# Patient Record
Sex: FEMALE | Race: ASIAN | NOT HISPANIC OR LATINO | Employment: UNEMPLOYED | URBAN - METROPOLITAN AREA
[De-identification: names, ages, dates, MRNs, and addresses within clinical notes are randomized per-mention and may not be internally consistent; named-entity substitution may affect disease eponyms.]

---

## 2023-04-27 ENCOUNTER — LAB (OUTPATIENT)
Dept: LAB | Facility: CLINIC | Age: 23
End: 2023-04-27
Payer: MEDICAID

## 2023-04-27 DIAGNOSIS — Z02.89 REFUGEE HEALTH EXAMINATION: Primary | ICD-10-CM

## 2023-04-27 DIAGNOSIS — Z02.89 REFUGEE HEALTH EXAMINATION: ICD-10-CM

## 2023-04-27 DIAGNOSIS — Z02.89 ENCOUNTER FOR HEALTH EXAMINATION OF REFUGEE: ICD-10-CM

## 2023-04-27 LAB
BASOPHILS # BLD AUTO: 0 10E3/UL (ref 0–0.2)
BASOPHILS NFR BLD AUTO: 1 %
EOSINOPHIL # BLD AUTO: 0.5 10E3/UL (ref 0–0.7)
EOSINOPHIL NFR BLD AUTO: 8 %
ERYTHROCYTE [DISTWIDTH] IN BLOOD BY AUTOMATED COUNT: 13.3 % (ref 10–15)
HCT VFR BLD AUTO: 37.9 % (ref 35–47)
HGB BLD-MCNC: 11.9 G/DL (ref 11.7–15.7)
IMM GRANULOCYTES # BLD: 0 10E3/UL
IMM GRANULOCYTES NFR BLD: 0 %
LYMPHOCYTES # BLD AUTO: 1.8 10E3/UL (ref 0.8–5.3)
LYMPHOCYTES NFR BLD AUTO: 29 %
MCH RBC QN AUTO: 25.1 PG (ref 26.5–33)
MCHC RBC AUTO-ENTMCNC: 31.4 G/DL (ref 31.5–36.5)
MCV RBC AUTO: 80 FL (ref 78–100)
MONOCYTES # BLD AUTO: 0.3 10E3/UL (ref 0–1.3)
MONOCYTES NFR BLD AUTO: 5 %
NEUTROPHILS # BLD AUTO: 3.6 10E3/UL (ref 1.6–8.3)
NEUTROPHILS NFR BLD AUTO: 57 %
NRBC # BLD AUTO: 0 10E3/UL
NRBC BLD AUTO-RTO: 0 /100
PLATELET # BLD AUTO: 295 10E3/UL (ref 150–450)
RBC # BLD AUTO: 4.74 10E6/UL (ref 3.8–5.2)
WBC # BLD AUTO: 6.3 10E3/UL (ref 4–11)

## 2023-04-27 PROCEDURE — 86803 HEPATITIS C AB TEST: CPT

## 2023-04-27 PROCEDURE — 86481 TB AG RESPONSE T-CELL SUSP: CPT

## 2023-04-27 PROCEDURE — 36415 COLL VENOUS BLD VENIPUNCTURE: CPT | Performed by: FAMILY MEDICINE

## 2023-04-27 PROCEDURE — 99000 SPECIMEN HANDLING OFFICE-LAB: CPT | Performed by: FAMILY MEDICINE

## 2023-04-27 PROCEDURE — 86765 RUBEOLA ANTIBODY: CPT

## 2023-04-27 PROCEDURE — 87591 N.GONORRHOEAE DNA AMP PROB: CPT

## 2023-04-27 PROCEDURE — 82306 VITAMIN D 25 HYDROXY: CPT

## 2023-04-27 PROCEDURE — 80061 LIPID PANEL: CPT | Performed by: FAMILY MEDICINE

## 2023-04-27 PROCEDURE — 86762 RUBELLA ANTIBODY: CPT

## 2023-04-27 PROCEDURE — 86704 HEP B CORE ANTIBODY TOTAL: CPT

## 2023-04-27 PROCEDURE — 86787 VARICELLA-ZOSTER ANTIBODY: CPT

## 2023-04-27 PROCEDURE — 86682 HELMINTH ANTIBODY: CPT | Mod: 90 | Performed by: FAMILY MEDICINE

## 2023-04-27 PROCEDURE — 86780 TREPONEMA PALLIDUM: CPT

## 2023-04-27 PROCEDURE — 86706 HEP B SURFACE ANTIBODY: CPT

## 2023-04-27 PROCEDURE — 87389 HIV-1 AG W/HIV-1&-2 AB AG IA: CPT

## 2023-04-27 PROCEDURE — 87340 HEPATITIS B SURFACE AG IA: CPT

## 2023-04-27 PROCEDURE — 87491 CHLMYD TRACH DNA AMP PROBE: CPT

## 2023-04-27 PROCEDURE — 85025 COMPLETE CBC W/AUTO DIFF WBC: CPT | Performed by: FAMILY MEDICINE

## 2023-04-27 PROCEDURE — 80053 COMPREHEN METABOLIC PANEL: CPT | Performed by: FAMILY MEDICINE

## 2023-04-27 PROCEDURE — 86708 HEPATITIS A ANTIBODY: CPT

## 2023-04-28 LAB
ALBUMIN SERPL BCG-MCNC: 4.6 G/DL (ref 3.5–5.2)
ALP SERPL-CCNC: 65 U/L (ref 35–104)
ALT SERPL W P-5'-P-CCNC: 13 U/L (ref 10–35)
ANION GAP SERPL CALCULATED.3IONS-SCNC: 10 MMOL/L (ref 7–15)
AST SERPL W P-5'-P-CCNC: 20 U/L (ref 10–35)
BILIRUB SERPL-MCNC: 0.4 MG/DL
BUN SERPL-MCNC: 8.1 MG/DL (ref 6–20)
C TRACH DNA SPEC QL PROBE+SIG AMP: NEGATIVE
CALCIUM SERPL-MCNC: 9.2 MG/DL (ref 8.6–10)
CHLORIDE SERPL-SCNC: 106 MMOL/L (ref 98–107)
CHOLEST SERPL-MCNC: 166 MG/DL
CREAT SERPL-MCNC: 0.63 MG/DL (ref 0.51–0.95)
DEPRECATED CALCIDIOL+CALCIFEROL SERPL-MC: 17 UG/L (ref 20–75)
DEPRECATED HCO3 PLAS-SCNC: 25 MMOL/L (ref 22–29)
GFR SERPL CREATININE-BSD FRML MDRD: >90 ML/MIN/1.73M2
GLUCOSE SERPL-MCNC: 120 MG/DL (ref 70–99)
HAV IGG SER QL IA: REACTIVE
HBV CORE AB SERPL QL IA: NONREACTIVE
HBV SURFACE AB SERPL IA-ACNC: 321.96 M[IU]/ML
HBV SURFACE AB SERPL IA-ACNC: REACTIVE M[IU]/ML
HBV SURFACE AG SERPL QL IA: NONREACTIVE
HCV AB SERPL QL IA: NONREACTIVE
HDLC SERPL-MCNC: 54 MG/DL
HIV 1+2 AB+HIV1 P24 AG SERPL QL IA: NONREACTIVE
LDLC SERPL CALC-MCNC: 98 MG/DL
MEV IGG SER IA-ACNC: 213 AU/ML
MEV IGG SER IA-ACNC: POSITIVE
N GONORRHOEA DNA SPEC QL NAA+PROBE: NEGATIVE
NONHDLC SERPL-MCNC: 112 MG/DL
POTASSIUM SERPL-SCNC: 3.8 MMOL/L (ref 3.4–5.3)
PROT SERPL-MCNC: 7.4 G/DL (ref 6.4–8.3)
SODIUM SERPL-SCNC: 141 MMOL/L (ref 136–145)
STRONGYLOIDES IGG SER IA-ACNC: 0.3 IV
T PALLIDUM AB SER QL: NONREACTIVE
TRIGL SERPL-MCNC: 68 MG/DL
VZV IGG SER QL IA: 1677 INDEX
VZV IGG SER QL IA: POSITIVE

## 2023-04-29 LAB
GAMMA INTERFERON BACKGROUND BLD IA-ACNC: 0.16 IU/ML
M TB IFN-G BLD-IMP: NEGATIVE
M TB IFN-G CD4+ BCKGRND COR BLD-ACNC: 9.84 IU/ML
MITOGEN IGNF BCKGRD COR BLD-ACNC: -0.02 IU/ML
MITOGEN IGNF BCKGRD COR BLD-ACNC: -0.05 IU/ML
QUANTIFERON MITOGEN: 10 IU/ML
QUANTIFERON NIL TUBE: 0.16 IU/ML
QUANTIFERON TB1 TUBE: 0.11 IU/ML
QUANTIFERON TB2 TUBE: 0.14
SCHISTOSOMA IGG SER IA-ACNC: 20 U

## 2023-05-01 LAB
RUBV IGG SERPL QL IA: 15.1 INDEX
RUBV IGG SERPL QL IA: POSITIVE

## 2023-05-03 ENCOUNTER — OFFICE VISIT (OUTPATIENT)
Dept: FAMILY MEDICINE | Facility: CLINIC | Age: 23
End: 2023-05-03
Payer: MEDICAID

## 2023-05-03 ENCOUNTER — TELEPHONE (OUTPATIENT)
Dept: FAMILY MEDICINE | Facility: CLINIC | Age: 23
End: 2023-05-03

## 2023-05-03 VITALS
RESPIRATION RATE: 20 BRPM | DIASTOLIC BLOOD PRESSURE: 62 MMHG | TEMPERATURE: 98.4 F | WEIGHT: 99.75 LBS | OXYGEN SATURATION: 98 % | HEART RATE: 93 BPM | HEIGHT: 61 IN | BODY MASS INDEX: 18.83 KG/M2 | SYSTOLIC BLOOD PRESSURE: 98 MMHG

## 2023-05-03 DIAGNOSIS — Z02.89 REFUGEE HEALTH EXAMINATION: Primary | ICD-10-CM

## 2023-05-03 DIAGNOSIS — Z23 NEED FOR VACCINATION: ICD-10-CM

## 2023-05-03 DIAGNOSIS — K02.9 DENTAL CARIES: ICD-10-CM

## 2023-05-03 DIAGNOSIS — R73.09 ELEVATED GLUCOSE: ICD-10-CM

## 2023-05-03 DIAGNOSIS — R63.6 UNDERWEIGHT: ICD-10-CM

## 2023-05-03 DIAGNOSIS — R63.0 POOR APPETITE: ICD-10-CM
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
## 2023-05-03 LAB
AMYLASE SERPL-CCNC: 161 U/L (ref 28–100)
ANION GAP SERPL CALCULATED.3IONS-SCNC: 12 MMOL/L (ref 7–15)
BUN SERPL-MCNC: 9.7 MG/DL (ref 6–20)
CALCIUM SERPL-MCNC: 9.5 MG/DL (ref 8.6–10)
CHLORIDE SERPL-SCNC: 104 MMOL/L (ref 98–107)
CREAT SERPL-MCNC: 0.59 MG/DL (ref 0.51–0.95)
DEPRECATED HCO3 PLAS-SCNC: 24 MMOL/L (ref 22–29)
GFR SERPL CREATININE-BSD FRML MDRD: >90 ML/MIN/1.73M2
GLUCOSE SERPL-MCNC: 120 MG/DL (ref 70–99)
HBA1C MFR BLD: 5 % (ref 0–5.6)
LIPASE SERPL-CCNC: 36 U/L (ref 13–60)
POTASSIUM SERPL-SCNC: 4.1 MMOL/L (ref 3.4–5.3)
SODIUM SERPL-SCNC: 140 MMOL/L (ref 136–145)

## 2023-05-03 PROCEDURE — 36415 COLL VENOUS BLD VENIPUNCTURE: CPT | Performed by: FAMILY MEDICINE

## 2023-05-03 PROCEDURE — 0124A COVID-19 BIVALENT 12+ (PFIZER): CPT | Performed by: FAMILY MEDICINE

## 2023-05-03 PROCEDURE — 90471 IMMUNIZATION ADMIN: CPT | Performed by: FAMILY MEDICINE

## 2023-05-03 PROCEDURE — 90713 POLIOVIRUS IPV SC/IM: CPT | Performed by: FAMILY MEDICINE

## 2023-05-03 PROCEDURE — 90472 IMMUNIZATION ADMIN EACH ADD: CPT | Performed by: FAMILY MEDICINE

## 2023-05-03 PROCEDURE — 82150 ASSAY OF AMYLASE: CPT | Performed by: FAMILY MEDICINE

## 2023-05-03 PROCEDURE — 83036 HEMOGLOBIN GLYCOSYLATED A1C: CPT | Performed by: FAMILY MEDICINE

## 2023-05-03 PROCEDURE — 91312 COVID-19 BIVALENT 12+ (PFIZER): CPT | Performed by: FAMILY MEDICINE

## 2023-05-03 PROCEDURE — 90715 TDAP VACCINE 7 YRS/> IM: CPT | Performed by: FAMILY MEDICINE

## 2023-05-03 PROCEDURE — 99213 OFFICE O/P EST LOW 20 MIN: CPT | Mod: 25 | Performed by: FAMILY MEDICINE

## 2023-05-03 PROCEDURE — 80048 BASIC METABOLIC PNL TOTAL CA: CPT | Performed by: FAMILY MEDICINE

## 2023-05-03 PROCEDURE — 83690 ASSAY OF LIPASE: CPT | Performed by: FAMILY MEDICINE

## 2023-05-03 PROCEDURE — 99385 PREV VISIT NEW AGE 18-39: CPT | Mod: 25 | Performed by: FAMILY MEDICINE

## 2023-05-03 PROCEDURE — 90651 9VHPV VACCINE 2/3 DOSE IM: CPT | Performed by: FAMILY MEDICINE

## 2023-05-03 NOTE — PROGRESS NOTES
SUBJECTIVE:   CC: Ugo is an 22 year old who presents for preventive health visit.       5/3/2023    11:40 AM   Additional Questions   Roomed by Renaepetros RADAMES   Accompanied by Family     HPI  Bug bites - since arriving in the [place] - mainly on her arms.    Poor appetite - wants to be able to eat more. She thinks she does not have good taste. No upper stomach pain, no constipation, no diarrhea; this has been going on for many years. Eats a variety of foods.    Knows she has a couple cavities that need repair. There is no pain.    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    Today's PHQ-2 Score:       5/3/2023    11:40 AM   PHQ-2 ( 1999 [employer])   Q1: Little interest or pleasure in doing things 0   Q2: Feeling down, depressed or hopeless 0   PHQ-2 Score 0   Q1: Little interest or pleasure in doing things Not at all   Q2: Feeling down, depressed or hopeless Not at all   PHQ-2 Score 0      Mood: - good  Nightmares - no  Thinking too much - no  Avoiding - no  Abuse: no    Have you ever done Advance Care Planning? (For example, a Health Directive, POLST, or a discussion with a medical provider or your loved ones about your wishes): No, advance care planning information given to patient to review.  Patient plans to discuss their wishes with loved ones or provider.      Social History     Tobacco Use     Smoking status: Never     Passive exposure: Never     Smokeless tobacco: Never   Vaping Use     Vaping status: Not on file   Substance Use Topics     Alcohol use: Not on file              View : No data to display.              Reviewed orders with patient.  Reviewed health maintenance and updated orders accordingly - Yes  Lab work is in process  Labs reviewed in EPIC    Breast Cancer Screening:    Menarche 16, regular periods    History of abnormal Pap smear: NO - age 21-29 PAP every 3 years recommended     Reviewed and updated as needed this visit by clinical staff   Tobacco  Allergies            "    Reviewed and updated as needed this visit by Provider                 Past Medical History:   Diagnosis Date     H/O febrile seizure     a few minutes, only happened once, about age 1      Past Surgical History:   Procedure Laterality Date     NO PAST SURGERIES       OB History    Para Term  AB Living   0 0 0 0 0 0   SAB IAB Ectopic Multiple Live Births   0 0 0 0 0       Review of Systems  CONSTITUTIONAL: NEGATIVE for fever, chills, change in weight  EYES: NEGATIVE for vision changes or irritation  ENT: NEGATIVE for ear, mouth and throat problems  RESP: NEGATIVE for significant cough or SOB  BREAST: NEGATIVE for masses, tenderness or discharge  CV: NEGATIVE for chest pain, palpitations or peripheral edema  GI: NEGATIVE for nausea, abdominal pain, heartburn, or change in bowel habits  : NEGATIVE for unusual urinary or vaginal symptoms. Periods are regular.  MUSCULOSKELETAL: NEGATIVE for significant arthralgias or myalgia  NEURO: NEGATIVE for weakness, dizziness or paresthesias  PSYCHIATRIC: NEGATIVE for changes in mood or affect     OBJECTIVE:   BP 98/62 (BP Location: Left arm)   Pulse 93   Temp 98.4  F (36.9  C) (Oral)   Resp 20   Ht 1.556 m (5' 1.25\")   Wt 45.2 kg (99 lb 12 oz)   LMP 2023   SpO2 98%   BMI 18.69 kg/m    Physical Exam  GENERAL: healthy, alert and no distress  EYES: Eyes grossly normal to inspection, PERRL and conjunctivae and sclerae normal  HENT: normal cephalic/atraumatic, ear canals and TM's normal, nose and mouth without ulcers or lesions, oropharynx clear and oral mucous membranes moist; teeth are white and clean except for two small but deep caries on the bicuspids (bilaterally)  NECK: no adenopathy, no asymmetry, masses, or scars and thyroid normal to palpation  RESP: lungs clear to auscultation - no rales, rhonchi or wheezes  CV: regular rate and rhythm, normal S1 S2, no S3 or S4, no murmur, click or rub, no peripheral edema and peripheral pulses " strong  ABDOMEN: soft, nontender, no hepatosplenomegaly, no masses and bowel sounds normal  MS: no gross musculoskeletal defects noted, no edema  SKIN: no suspicious lesions; a few bug bites on lower arms and torso  NEURO: Normal strength and tone, mentation intact and speech normal  PSYCH: mentation appears normal, affect normal/bright    Diagnostic Test Results:  Labs reviewed in Epic  Results for orders placed or performed in visit on 05/03/23 (from the past 24 hour(s))   Hemoglobin A1c   Result Value Ref Range    Hemoglobin A1C 5.0 0.0 - 5.6 %       ASSESSMENT/PLAN:   (Z02.89) Refugee health examination  (primary encounter diagnosis)  Comment: doing well generally  Plan: REVIEW OF HEALTH MAINTENANCE PROTOCOL ORDERS    (Z23) Need for vaccination  Comment: started some vaccinations, return for more  Plan: HPV9 (GARDASIL 9), TDAP VACCINE (Adacel,         Boostrix), IPV, IM/SUBQ (6+ WKS), COVID-19         BIVALENT 12+ (PFIZER)    (R73.09) Elevated glucose  Comment: this must have been a fluke - A1-C is normal  Plan: Basic metabolic panel  (Ca, Cl, CO2, Creat,         Gluc, K, Na, BUN), Hemoglobin A1c    (R63.0) Poor appetite  Comment: not clear what is causing this - await blood results and H pylori test results. I am encouraged she eats a variety of foods.  Plan: Helicobacter pylori Antigen Stool, Lipase,         Amylase    (K02.9) Dental caries  Comment: referred to dental to get these fixed  Plan: Dental Referral    (R63.6) Underweight  Comment: she needs to gain some weight - this will need to be monitored    Bug bites - I am quite sure these are from bed bugs; apply cortisone cream prn (prescribed to siblings); letter written to     I spent 55 min on this patient and the charting on 5/3/23.    COUNSELING:  Reviewed preventive health counseling, as reflected in patient instructions       Regular exercise       Healthy diet/nutrition       Osteoporosis prevention/bone health       what to expect  with a pap smear test        She reports that she has never smoked. She has never been exposed to tobacco smoke. She has never used smokeless tobacco.      Xochitl Salvador MD  [facility]      Prior to immunization administration, verified patients identity using patient s name and date of birth. Please see Immunization Activity for additional information.     Screening Questionnaire for Adult Immunization    Are you sick today?   No   Do you have allergies to medications, food, a vaccine component or latex?   No   Have you ever had a serious reaction after receiving a vaccination?   No   Do you have a long-term health problem with heart, lung, kidney, or metabolic disease (e.g., diabetes), asthma, a blood disorder, no spleen, complement component deficiency, a cochlear implant, or a spinal fluid leak?  Are you on long-term aspirin therapy?   No   Do you have cancer, leukemia, HIV/AIDS, or any other immune system problem?   No   Do you have a parent, brother, or sister with an immune system problem?   No   In the past 3 months, have you taken medications that affect  your immune system, such as prednisone, other steroids, or anticancer drugs; drugs for the treatment of rheumatoid arthritis, Crohn s disease, or psoriasis; or have you had radiation treatments?   No   Have you had a seizure, or a brain or other nervous system problem?   No   During the past year, have you received a transfusion of blood or blood    products, or been given immune (gamma) globulin or antiviral drug?   No   For women: Are you pregnant or is there a chance you could become       pregnant during the next month?   No   Have you received any vaccinations in the past 4 weeks?   No     Immunization questionnaire answers were all negative.      Injection of Tdap Ipv Covid HPV given by Slime Copeland. Patient instructed to remain in clinic for 15 minutes afterwards, and to report any adverse reactions.     Screening  performed by Slime Copeland on 5/3/2023 at 2:11 PM.

## 2023-05-04 NOTE — TELEPHONE ENCOUNTER
Please call this patient and note her sister, Ino Arizmendi (11/16/2000), needs the same thing.    Needs to come to get HPV #2 in a month - so between May 31 - June 2 for a nurse-only visit. Plus meningitis shot in a month. Orders are placed.    She can get HPV #3 at her November appt.    *note she is part of a family that has [place] been in the [place] for one [place] so keep things simple and check for  Understanding.    *please add the nurse-only appointment on Ino Arizmendi's chart, too

## 2023-05-08 ENCOUNTER — TELEPHONE (OUTPATIENT)
Dept: FAMILY MEDICINE | Facility: CLINIC | Age: 23
End: 2023-05-08
Payer: MEDICAID

## 2023-05-08 DIAGNOSIS — R74.8: ICD-10-CM

## 2023-05-08 DIAGNOSIS — R63.6 UNDERWEIGHT: ICD-10-CM

## 2023-05-08 DIAGNOSIS — R63.0 POOR APPETITE: Primary | ICD-10-CM

## 2023-05-08 PROCEDURE — 87338 HPYLORI STOOL AG IA: CPT | Performed by: FAMILY MEDICINE

## 2023-05-08 NOTE — TELEPHONE ENCOUNTER
Called pt in an attempt to relay Dr. Salvador's message. Could not reach pt or LVM as VM as not been set up yet.    - will try again another time      Ino Castro Cem Say, BSN RN  [facility]

## 2023-05-08 NOTE — TELEPHONE ENCOUNTER
Please call Ugo Mijaers and let her know her pancreas enzyme test, for amylase, is elevated. Dr. Salvador believes this is why her appetite is low. To investigate this more, Dr. Salvador recommends we do a CT scan of the pancrease to see if the imaging is normal.     Someone will call her to set up the appointment for the CT.

## 2023-05-09 LAB — H PYLORI AG STL QL IA: POSITIVE

## 2023-05-09 NOTE — TELEPHONE ENCOUNTER
Called pt in an attempt to really Dr. Salvador's message x 2. Both phone numbers were not available and could not leave a VM.    - will try again another time.    Ino Castro Cem Say, BSN RN  [facility]

## 2023-05-10 NOTE — TELEPHONE ENCOUNTER
Patient returns call. Writer relay RN/provider's message below.   TC also assisting pt schedule CT scan.     Caller verbalizes understanding and has no further questions.     Nicolette Suggs CMA ,  [facility]  May 10, 2023 9:28 AM

## 2023-05-10 NOTE — TELEPHONE ENCOUNTER
Called patient x 3 via phone numbers on file with no answer.  Finally able to reach family, Ugo Keith requesting family to have patient call back to the clinic with clinic number provided.    If patient calls back, please relay provider message below.    Thank you      UJLIA Koroma, RN  [facility]          Please call Ugo Mijares and let her know her pancreas enzyme test, for amylase, is elevated. Dr. Salvador believes this is why her appetite is low. To investigate this more, Dr. Salvador recommends we do a CT scan of the pancrease to see if the imaging is normal.      Someone will call her to set up the appointment for the CT.

## 2023-05-16 ENCOUNTER — HOSPITAL ENCOUNTER (OUTPATIENT)
Dept: CT IMAGING | Facility: HOSPITAL | Age: 23
Discharge: HOME | End: 2023-05-16
Attending: FAMILY MEDICINE | Admitting: FAMILY MEDICINE
Payer: MEDICAID

## 2023-05-16 ENCOUNTER — TELEPHONE (OUTPATIENT)
Dept: FAMILY MEDICINE | Facility: CLINIC | Age: 23
End: 2023-05-16
Payer: MEDICAID

## 2023-05-16 DIAGNOSIS — R74.8: Primary | ICD-10-CM

## 2023-05-16 DIAGNOSIS — R63.0 POOR APPETITE: ICD-10-CM

## 2023-05-16 DIAGNOSIS — R63.6 UNDERWEIGHT: ICD-10-CM

## 2023-05-16 DIAGNOSIS — R74.8: ICD-10-CM

## 2023-05-16 DIAGNOSIS — R73.09 ELEVATED GLUCOSE: ICD-10-CM

## 2023-05-16 PROCEDURE — 250N000011 HC RX IP 250 OP 636: Performed by: FAMILY MEDICINE

## 2023-05-16 PROCEDURE — 74178 CT ABD&PLV WO CNTR FLWD CNTR: CPT

## 2023-05-16 RX ORDER — IOPAMIDOL 755 MG/ML
90 INJECTION, SOLUTION INTRAVASCULAR ONCE
Status: COMPLETED | OUTPATIENT
Start: 2023-05-16 | End: 2023-05-16

## 2023-05-16 RX ADMIN — IOPAMIDOL 90 ML: 755 INJECTION, SOLUTION INTRAVENOUS at 16:55

## 2023-05-16 NOTE — TELEPHONE ENCOUNTER
"Called patient with assistance of an  to inform patient of test result and treatment recommendation below.  Future MTM scheduled with date, time and location confirmed with patient.  Verified address and phone number on file appears to be accurate.  Patient needs ride to and from appointment. Information sent to transportation coordinate.    Will route encounter to PCP to place a referral to MTM, Enio Moreno, JULIA Kaur, RN  [facility]        Xochitl Salvador MD  [facility]  Team - please call patient with results. Let her know she has a stomach infection which is likely causing some of her upper stomach pain. IF we treat this infection, then the pain should improve. However, the treatment is a bit complicated as she has to take several meds at the same time. Dr. Salvador recommends she come back to the clinic to see the clinical pharmacist to get the meds plus an explanation on who to take them. (If she agrees, let Dr. HOLLY know to place the MTM referral).   Also, her pancreas is irritated. This is likely why her appetite is low and why she gets full quickly when she eats. Taking a couple weeks to eat minimally and \"rest\" the pancreas would be good, followed by normal eating which should feel better to do.     " Addressed by provider. No further action.

## 2023-05-16 NOTE — TELEPHONE ENCOUNTER
"----- Message from Xochitl Salvador MD sent at 5/15/2023  9:05 PM CDT -----  Team - please call patient with results. Let her know she has a stomach infection which is likely causing some of her upper stomach pain. IF we treat this infection, then the pain should improve. However, the treatment is a bit complicated as she has to take several meds at the same time. Dr. Salvador recommends she come back to the clinic to see the clinical pharmacist to get the meds plus an explanation on who to take them. (If she agrees, let Dr. HOLLY know to place the MT referral).  Also, her pancreas is irritated. This is likely why her appetite is low and why she gets full quickly when she eats. Taking a couple weeks to eat minimally and \"rest\" the pancreas would be good, followed by normal eating which should feel better to do.  "

## 2023-05-22 ENCOUNTER — TELEPHONE (OUTPATIENT)
Dept: PHARMACY | Facility: CLINIC | Age: 23
End: 2023-05-22
Payer: MEDICAID

## 2023-05-22 DIAGNOSIS — A04.8 H. PYLORI INFECTION: Primary | ICD-10-CM

## 2023-05-22 RX ORDER — AMOXICILLIN 500 MG/1
1000 CAPSULE ORAL 2 TIMES DAILY
Qty: 56 CAPSULE | Refills: 0 | Status: SHIPPED | OUTPATIENT
Start: 2023-05-22 | End: 2023-11-10

## 2023-05-22 RX ORDER — CLARITHROMYCIN 500 MG
500 TABLET ORAL 2 TIMES DAILY
Qty: 28 TABLET | Refills: 0 | Status: SHIPPED | OUTPATIENT
Start: 2023-05-22 | End: 2023-11-10

## 2023-05-22 NOTE — Clinical Note
Nurses, Please contact patient and let them know that prescriptions for H. pylori were sent to [facility] today.  Recommend they bring all 3 medications to MTM visit on Wednesday 5/24.  Please let me know if there are any questions.  Thank you Garima Moreno, PharmD, [facility] Med[facility] Therapy Management Pharmacist

## 2023-05-22 NOTE — PROGRESS NOTES
Patient seen me for MTM H. pylori education visit on 5/24.  Patient primary care provider not in clinic today, therefore discussed with covering physician today.  Patient H. pylori positive, no current prescriptions sent for H. pylori treatment.  Patient does not have a prior exposure to macrolides and no penicillin allergy, therefore recommend initiating clarithromycin based triple therapy with amoxicillin.  Per discussion with Dr. Teixeira, prescription sent today for PPI, clarithromycin, and amoxicillin twice daily for 14 days.  Patient to bring with all medications to MTM visit on 5/24.    Garima Moreno, PharmD, BCACP  Medication Therapy Management Pharmacist

## 2023-05-22 NOTE — TELEPHONE ENCOUNTER
Garima Moreno, PharmD  [facility]  Nurses,   Please contact patient and let them know that prescriptions for H. pylori were sent to [facility] today.  Recommend they bring all 3 medications to MTM visit on Wednesday 5/24.  Please let me know if there are any questions.     Thank you   Garima Moreno, PharmD, [facility]   Medication Therapy Management Pharmacist       Writer called patient with the help of a Sweta  regarding provider's message below. Provider message relayed to patient.    Patient will  RX  and bring it with her to appointment on Wednesday.    Patient verbalizes understanding, agrees with plan and has no further questions.    Closing encounter.    GABRIEL ThomasN, RN   [facility]

## 2023-05-23 NOTE — PROGRESS NOTES
Clinical Pharmacy Consult:                                                    Ugo Mijares is a 22 year old female coming in for a clinical pharmacist consult.  She was referred to me from Xochitl Vale MD. ID# [id number].     Reason for Consult: H pylori treatment regimen review  Patient was contacted 5/22 regarding medication orders being sent. Patient has picked up medications and brought them with her today.     Discussion:   H Pylori treatment:   Amoxicillin 1,000 mg Oral 2 TIMES DAILY  Clarithromycin 500 mg Oral 2 TIMES DAILY  Omeprazole 20 mg Oral 2 TIMES DAILY  Notes that she started treatment already - took as directed starting this morning, therefore directed her to continue as prescribed for the full 2 weeks.    Explained to patient today the importance of medication adherence for the full course of treatment.  Encouraged consistent use of medications without missed doses for effective treatment and to avoid rebound infection.  For missed doses of treatment, recommend taking as soon as possible, though if closer to next dose recommend skipping dose and continuing as prescribed through completion of treatment.  Provided education that symptoms of H. pylori infection may worsen before they improve, recommend contacting clinic if experiencing severe symptoms that prevent completion of treatment. Discussed with patient potential medication side effects. Defer to PCP to determine if retesting for H pylori is approrpaite in the futre.     Plan:  1. Reviewed with patient today appropraite instructions for use of H pylori infection medications along with stickers on each bottle indicating time of administration (red for AM, green for PM) and quantity of capsule/tablet to help with adherence  2.  Recommended administering medications every 12 hours    Follow up: PCP as directed.     Garima Moreno, PharmD, BCACP  Medication Therapy Management Pharmacist

## 2023-05-24 ENCOUNTER — OFFICE VISIT (OUTPATIENT)
Dept: PHARMACY | Facility: CLINIC | Age: 23
End: 2023-05-24
Attending: FAMILY MEDICINE
Payer: MEDICAID

## 2023-05-24 DIAGNOSIS — A04.8 H. PYLORI INFECTION: Primary | ICD-10-CM

## 2023-05-24 PROCEDURE — 99207 PR NO CHARGE LOS: CPT | Performed by: PHARMACIST

## 2023-05-24 NOTE — Clinical Note
For this patient H pylori clarithromycin triple therapy was sent per discussion with Dr. Teixeira 2 days ago. She brought her meds with her today and education was provided. She plans to take the meds for the next 2 weeks.   So that you are aware - it is usually helpful if the medications are prescribed in advance so that they can bring the medications with to review at my visit with them. Please let me know if there are questions on that.   Thanks Enio

## 2023-06-01 ENCOUNTER — ALLIED HEALTH/NURSE VISIT (OUTPATIENT)
Dept: FAMILY MEDICINE | Facility: CLINIC | Age: 23
End: 2023-06-01
Payer: MEDICAID

## 2023-06-01 DIAGNOSIS — Z23 NEED FOR VACCINATION: ICD-10-CM

## 2023-06-01 PROCEDURE — 90619 MENACWY-TT VACCINE IM: CPT

## 2023-06-01 PROCEDURE — 90472 IMMUNIZATION ADMIN EACH ADD: CPT

## 2023-06-01 PROCEDURE — 90471 IMMUNIZATION ADMIN: CPT

## 2023-06-01 PROCEDURE — 99207 PR NO CHARGE NURSE ONLY: CPT

## 2023-06-01 PROCEDURE — 90651 9VHPV VACCINE 2/3 DOSE IM: CPT

## 2023-06-01 NOTE — PROGRESS NOTES
Prior to immunization administration, verified patients identity using patient s name and date of birth. Please see Immunization Activity for additional information.     Screening Questionnaire for Adult Immunization    Are you sick today?   No   Do you have allergies to medications, food, a vaccine component or latex?   No   Have you ever had a serious reaction after receiving a vaccination?   No   Do you have a long-term health problem with heart, lung, kidney, or metabolic disease (e.g., diabetes), asthma, a blood disorder, no spleen, complement component deficiency, a cochlear implant, or a spinal fluid leak?  Are you on long-term aspirin therapy?   No   Do you have cancer, leukemia, HIV/AIDS, or any other immune system problem?   No   Do you have a parent, brother, or sister with an immune system problem?   No   In the past 3 months, have you taken medications that affect  your immune system, such as prednisone, other steroids, or anticancer drugs; drugs for the treatment of rheumatoid arthritis, Crohn s disease, or psoriasis; or have you had radiation treatments?   No   Have you had a seizure, or a brain or other nervous system problem?   No   During the past year, have you received a transfusion of blood or blood    products, or been given immune (gamma) globulin or antiviral drug?   No   For women: Are you pregnant or is there a chance you could become       pregnant during the next month?   No   Have you received any vaccinations in the past 4 weeks?   No     Immunization questionnaire answers were all negative.    I have reviewed the following standing orders:   This patient is due and qualifies for the HPV vaccine.    Click here for HPV (Adult 15-45Y) Standing Order     I have reviewed the vaccines inclusion and exclusion criteria;No concerns regarding eligibility.           This patient is due and qualifies for the Meningococcal (MenACWY) vaccine.    Click here for Meningococcal (MenACWY) Vaccine Adult  (19+) Standing Order    I have reviewed the vaccines inclusion and exclusion criteria; No concerns regarding eligibility.         Injection of HPV and MenQuafi given by Rosa Arizmendi MA. Patient instructed to remain in clinic for 15 minutes afterwards, and to report any adverse reactions.     Screening performed by Rosa Arizmendi MA on 6/1/2023 at 9:33 AM.

## 2023-09-12 ENCOUNTER — TELEPHONE (OUTPATIENT)
Dept: FAMILY MEDICINE | Facility: CLINIC | Age: 23
End: 2023-09-12
Payer: COMMERCIAL

## 2023-09-12 NOTE — TELEPHONE ENCOUNTER
Please call Ugo Mijares and give her this address. The address is NOT where she would work but is where she can go to sign up (they will help with language - either with an in person  or with a phone )    [employer]  [address] [place] [address]  Phone [phone]    (I am making a similar telephone encounter on her sister's chart (Eh Paw) - but the info can be given to both girls with one call)

## 2023-09-12 NOTE — TELEPHONE ENCOUNTER
Called patient with assistance of an  to relay provider message below.  Patient verbalized understood.    JULIA oKroma, RN  [facility]      Please call Ugo Brenden Mijares and give her this address. The address is NOT where she would work but is where she can go to sign up (they will help with language - either with an in person  or with a phone )     [facility]  [address] [place] [address]  Phone [phone]     (I am making a similar telephone encounter on her sister's chart (Eh Paw) - but the info can be given to both girls with one call)

## 2023-09-19 ENCOUNTER — TELEPHONE (OUTPATIENT)
Dept: FAMILY MEDICINE | Facility: CLINIC | Age: 23
End: 2023-09-19
Payer: COMMERCIAL

## 2023-09-19 NOTE — TELEPHONE ENCOUNTER
Patient Quality Outreach    Patient is due for the following:   Cervical Cancer Screening - PAP Needed    Next Steps:   Patient has upcoming appointment, these items will be addressed at that time.    Type of outreach:    Phone, spoke to patient/parent. Has future appointment    Next Steps:  Reach out within 90 days via Phone.    Max number of attempts reached: No. Will try again in 90 days if patient still on fail list.    Questions for provider review:    None           Marcy Champagne  Chart routed to Care Team.

## 2023-11-10 ENCOUNTER — OFFICE VISIT (OUTPATIENT)
Dept: FAMILY MEDICINE | Facility: CLINIC | Age: 23
End: 2023-11-10
Payer: COMMERCIAL

## 2023-11-10 VITALS
OXYGEN SATURATION: 99 % | SYSTOLIC BLOOD PRESSURE: 110 MMHG | DIASTOLIC BLOOD PRESSURE: 66 MMHG | WEIGHT: 106.25 LBS | HEIGHT: 61 IN | TEMPERATURE: 98 F | RESPIRATION RATE: 18 BRPM | HEART RATE: 77 BPM | BODY MASS INDEX: 20.06 KG/M2

## 2023-11-10 DIAGNOSIS — Z23 NEED FOR VACCINATION: ICD-10-CM

## 2023-11-10 DIAGNOSIS — B65.9 SCHISTOSOMIASIS: Primary | ICD-10-CM

## 2023-11-10 DIAGNOSIS — R10.13 ABDOMINAL PAIN, EPIGASTRIC: ICD-10-CM

## 2023-11-10 DIAGNOSIS — Z86.19 HISTORY OF HELICOBACTER PYLORI INFECTION: ICD-10-CM

## 2023-11-10 PROBLEM — R63.0 POOR APPETITE: Status: RESOLVED | Noted: 2023-05-03 | Resolved: 2023-11-10

## 2023-11-10 PROCEDURE — 91320 SARSCV2 VAC 30MCG TRS-SUC IM: CPT | Performed by: FAMILY MEDICINE

## 2023-11-10 PROCEDURE — 90472 IMMUNIZATION ADMIN EACH ADD: CPT | Performed by: FAMILY MEDICINE

## 2023-11-10 PROCEDURE — 99395 PREV VISIT EST AGE 18-39: CPT | Mod: 25 | Performed by: FAMILY MEDICINE

## 2023-11-10 PROCEDURE — 90471 IMMUNIZATION ADMIN: CPT | Performed by: FAMILY MEDICINE

## 2023-11-10 PROCEDURE — 99213 OFFICE O/P EST LOW 20 MIN: CPT | Mod: 25 | Performed by: FAMILY MEDICINE

## 2023-11-10 PROCEDURE — 90651 9VHPV VACCINE 2/3 DOSE IM: CPT | Performed by: FAMILY MEDICINE

## 2023-11-10 PROCEDURE — 90480 ADMN SARSCOV2 VAC 1/ONLY CMP: CPT | Performed by: FAMILY MEDICINE

## 2023-11-10 PROCEDURE — 90686 IIV4 VACC NO PRSV 0.5 ML IM: CPT | Performed by: FAMILY MEDICINE

## 2023-11-10 RX ORDER — ACETAMINOPHEN 500 MG
500-1000 TABLET ORAL EVERY 6 HOURS PRN
Qty: 50 TABLET | Refills: 4 | Status: SHIPPED | OUTPATIENT
Start: 2023-11-10

## 2023-11-10 RX ORDER — PRAZIQUANTEL 600 MG/1
TABLET, FILM COATED ORAL
Qty: 3 TABLET | Refills: 0 | Status: SHIPPED | OUTPATIENT
Start: 2023-11-10 | End: 2024-03-26

## 2023-11-10 ASSESSMENT — ENCOUNTER SYMPTOMS: BREAST MASS: 0

## 2023-11-10 NOTE — PROGRESS NOTES
SUBJECTIVE:   CC: Ugo is an 23 year old who presents for preventive health visit.       11/10/2023     2:52 PM   Additional Questions   Roomed by Eva HUERTAS       Healthy Habits:     Getting at least 3 servings of Calcium per day:  Yes    Bi-annual eye exam:  NO    Dental care twice a year:  NO    Sleep apnea or symptoms of sleep apnea:  None    Diet:  Regular (no restrictions)    Frequency of exercise:  None    Taking medications regularly:  Not Applicable    Medication side effects:  Not applicable    Additional concerns today:  No    Schisto - does not remember she tested positive for this.    Occasional stomach pain after eating, not daily. Wonders if the H pylori infection could be back. She is having some upper stomach pain    Appetite - thinks it is a little better; she typically eats 2-3 meals per day. No snacks. Gets a good variety of foods - fruits, vegetables, meat, etc.      Social History     Tobacco Use    Smoking status: Never     Passive exposure: Current    Smokeless tobacco: Never    Tobacco comments:     Father smokes    Substance Use Topics    Alcohol use: Never             11/10/2023     2:07 PM   Alcohol Use   Prescreen: >3 drinks/day or >7 drinks/week? No     Reviewed orders with patient.  Reviewed health maintenance and updated orders accordingly - Yes  Labs reviewed in EPIC    History of abnormal Pap smear: has not ever has a pap     Reviewed and updated as needed this visit by clinical staff   Tobacco  Allergies  Meds              Reviewed and updated as needed this visit by Provider                 Past Medical History:   Diagnosis Date    H/O febrile seizure     a few minutes, only happened once, about age 1      Past Surgical History:   Procedure Laterality Date    NO PAST SURGERIES       OB History    Para Term  AB Living   0 0 0 0 0 0   SAB IAB Ectopic Multiple Live Births   0 0 0 0 0       Review of Systems   Breasts:  Negative for tenderness, breast mass and  "discharge.   Genitourinary:  Negative for pelvic pain, vaginal bleeding and vaginal discharge.        OBJECTIVE:   /66   Pulse 77   Temp 98  F (36.7  C) (Oral)   Resp 18   Ht 1.555 m (5' 1.22\")   Wt 48.2 kg (106 lb 4 oz)   LMP 10/27/2023 (Exact Date)   SpO2 99%   BMI 19.93 kg/m    Physical Exam  GENERAL: healthy, alert and no distress  NECK: no adenopathy, no asymmetry, masses, or scars and thyroid normal to palpation  RESP: lungs clear to auscultation - no rales, rhonchi or wheezes  CV: regular rate and rhythm, normal S1 S2, no S3 or S4, no murmur, click or rub, no peripheral edema and peripheral pulses strong  ABDOMEN: soft, nontender, no hepatosplenomegaly, no masses and bowel sounds normal  MS: no gross musculoskeletal defects noted, no edema  SKIN: no suspicious lesions or rashes  PSYCH: mentation appears normal, affect normal/bright    Diagnostic Test Results:  Labs reviewed in Epic    ASSESSMENT/PLAN:   (B65.9) Schistosomiasis  (primary encounter diagnosis)  Comment: will treat - she understands how to take the med  Plan: praziquantel (BILTRICIDE) 600 MG tablet    (Z86.19) History of Helicobacter pylori infection  Comment: recheck for antigen; if positive - retreat; if negative, give famotidine  Plan: Helicobacter pylori Antigen Stool    (Z23) Need for vaccination  Comment: gave flu, covid and HPV shots; I do not think she needs another IPV - I cannot find her refugee records of immunizations - I am quite sure she would have had several O[place]  Plan: INFLUENZA VACCINE IM > 6 MONTHS VALENT IIV4         (AFLURIA/FLUZONE), COVID-19 12+ (2023-24)         (PFIZER), HPV9 (GARDASIL 9)    (R10.13) Abdominal pain, epigastric  Comment: take acetaminophen prn - while we wait for H pylori test. Since that was treated, her appetite increased and she gained some weight. She feels better now.  Plan: acetaminophen (TYLENOL) 500 MG tablet      COUNSELING:  Reviewed preventive health " counseling, as reflected in patient instructions       Regular exercise       Healthy diet/nutrition       Immunizations  Vaccinated for: Covid-19 and Influenza  and HPV    She reports that she has never smoked. She has been exposed to tobacco smoke. She has never used smokeless tobacco.      Xochitl Salvador MD  [facility]

## 2023-11-15 PROCEDURE — 87338 HPYLORI STOOL AG IA: CPT | Performed by: FAMILY MEDICINE

## 2023-11-16 LAB — H PYLORI AG STL QL IA: NEGATIVE

## 2023-11-29 ENCOUNTER — TELEPHONE (OUTPATIENT)
Dept: FAMILY MEDICINE | Facility: CLINIC | Age: 23
End: 2023-11-29
Payer: COMMERCIAL

## 2023-11-29 DIAGNOSIS — K21.00 GASTROESOPHAGEAL REFLUX DISEASE WITH ESOPHAGITIS WITHOUT HEMORRHAGE: Primary | ICD-10-CM

## 2023-11-29 RX ORDER — FAMOTIDINE 40 MG/1
40 TABLET, FILM COATED ORAL DAILY
Qty: 60 TABLET | Refills: 4 | Status: SHIPPED | OUTPATIENT
Start: 2023-11-29 | End: 2024-03-26

## 2023-11-29 NOTE — TELEPHONE ENCOUNTER
"Writer called patient with the help of a \"Sweta\"  regarding provider's message below. Pt picked up call, and states she is currently driving, to call back around 9 or 10 AM tomorrow.    Will attempt to call pt back another time.    GABRIEL ThomasN, RN   [facility]    "

## 2023-11-29 NOTE — TELEPHONE ENCOUNTER
Please call this patient and let her know her H pylori test result is negative. This means the infection in her stomach remains gone. This is very good news.  If she still is having some stomach pain, Dr. Salvador has prescribed a medication, famotidine which she can taken once or twice per day, to help to relieve the discomfort.

## 2023-11-30 NOTE — TELEPHONE ENCOUNTER
"Writer called patient with the help of a \"Sweta\"  regarding provider's message below. No answer, left non-detailed VM with call back number.     Will attempt to call pt back another time.    If pt returns call back, please relay Dr. Salvador's message to pt. Thanks!    GABRIEL ThomasN, RN              [facility]  "

## 2023-12-01 NOTE — TELEPHONE ENCOUNTER
"Writer called patient with the help of a \"Sweta\"  regarding provider's message below.  Provider message relayed to patient.    Patient verbalizes understanding, agrees with plan and has no further questions.    Closing encounter.    GABRIEL ThomasN, RN   [facility]    "

## 2024-03-25 DIAGNOSIS — R74.8: Primary | ICD-10-CM

## 2024-03-25 DIAGNOSIS — Z00.00 PREVENTATIVE HEALTH CARE: ICD-10-CM

## 2024-03-26 ENCOUNTER — OFFICE VISIT (OUTPATIENT)
Dept: FAMILY MEDICINE | Facility: CLINIC | Age: 24
End: 2024-03-26
Payer: COMMERCIAL

## 2024-03-26 VITALS
HEART RATE: 78 BPM | HEIGHT: 61 IN | TEMPERATURE: 98.3 F | BODY MASS INDEX: 19.83 KG/M2 | SYSTOLIC BLOOD PRESSURE: 88 MMHG | DIASTOLIC BLOOD PRESSURE: 62 MMHG | WEIGHT: 105 LBS | OXYGEN SATURATION: 98 % | RESPIRATION RATE: 12 BRPM

## 2024-03-26 DIAGNOSIS — Z23 NEED FOR VACCINATION: ICD-10-CM

## 2024-03-26 DIAGNOSIS — R63.6 UNDERWEIGHT: Primary | ICD-10-CM

## 2024-03-26 DIAGNOSIS — Z56.9 EMPLOYMENT PROBLEM: ICD-10-CM

## 2024-03-26 PROBLEM — Z12.4 CERVICAL CANCER SCREENING: Status: ACTIVE | Noted: 2024-03-26

## 2024-03-26 PROBLEM — Z12.4 CERVICAL CANCER SCREENING: Status: RESOLVED | Noted: 2024-03-26 | Resolved: 2024-03-26

## 2024-03-26 PROCEDURE — 90713 POLIOVIRUS IPV SC/IM: CPT | Performed by: FAMILY MEDICINE

## 2024-03-26 PROCEDURE — 99214 OFFICE O/P EST MOD 30 MIN: CPT | Mod: 25 | Performed by: FAMILY MEDICINE

## 2024-03-26 PROCEDURE — 90471 IMMUNIZATION ADMIN: CPT | Performed by: FAMILY MEDICINE

## 2024-03-26 SDOH — ECONOMIC STABILITY - INCOME SECURITY: UNSPECIFIED PROBLEMS RELATED TO EMPLOYMENT: Z56.9

## 2024-03-26 NOTE — PROGRESS NOTES
"  Assessment & Plan     Underweight  She has gained weight since arrival in the [place] but slight loss of weight in past couple months. She denies upper stomach pain. Follow.  CT in 5/2023 showed a right ovarian cyst - without problematic symptoms, no need to follow up.    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 Since she had a TB quantiferon within the year AND no known TB exposures, that should be adequate.  I wrote this in a letter and told her to turn this in at her employer. They can call if there is a problem.    I was supposed to do a pap smear today but she arrived quite late for her appointment. I'll have her rescheduled for the pap and plan to follow up on amylase then prn.      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    H pylori - continues to feel well    Do I have an ovary problem?        Objective    BP (!) 88/62   Pulse 78   Temp 98.3  F (36.8  C) (Oral)   Resp 12   Ht 1.559 m (5' 1.38\")   Wt 47.6 kg (105 lb)   LMP 03/18/2024 (Exact Date)   SpO2 98%   BMI 19.60 kg/m    Body mass index is 19.6 kg/m .  Physical Exam   GENERAL: alert and no distress  RESP: lungs clear to auscultation - no rales, rhonchi or wheezes  CV: regular rate and rhythm, normal S1 S2, no S3 or S4, no murmur, click or rub, no peripheral edema  SKIN: no " suspicious lesions or rashes  PSYCH: mentation appears normal, affect normal/bright    Reviewed past labs and imaging    Prior to immunization administration, verified patients identity using patient s name and date of birth. Please see Immunization Activity for additional information.     Screening Questionnaire for Adult Immunization    Are you sick today?   No   Do you have allergies to medications, food, a vaccine component or latex?   No   Have you ever had a serious reaction after receiving a vaccination?   No   Do you have a long-term health problem with heart, lung, kidney, or metabolic disease (e.g., diabetes), asthma, a blood disorder, no spleen, complement component deficiency, a cochlear implant, or a spinal fluid leak?  Are you on long-term aspirin therapy?   No   Do you have cancer, leukemia, HIV/AIDS, or any other immune system problem?   No   Do you have a parent, brother, or sister with an immune system problem?   No   In the past 3 months, have you taken medications that affect  your immune system, such as prednisone, other steroids, or anticancer drugs; drugs for the treatment of rheumatoid arthritis, Crohn s disease, or psoriasis; or have you had radiation treatments?   No   Have you had a seizure, or a brain or other nervous system problem?   No   During the past year, have you received a transfusion of blood or blood    products, or been given immune (gamma) globulin or antiviral drug?   No   For women: Are you pregnant or is there a chance you could become       pregnant during the next month?   No   Have you received any vaccinations in the past 4 weeks?   No     Immunization questionnaire answers were all negative.      Patient instructed to remain in clinic for 15 minutes afterwards, and to report any adverse reactions.     Screening performed by Ugo Hall MA on 3/26/2024 at 8:23 AM.         Signed Electronically by: Xochitl Salvador MD

## 2024-03-26 NOTE — LETTER
3/26/2024          This letter is to state that Ugo Mijares ( 2000)  had a quantiferon gold TB test in 2023 and it was negative/normal. 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    If you have additional questions, please call (number above).      Sincerely,        Xochitl Salvador MD

## 2024-06-24 ENCOUNTER — OFFICE VISIT (OUTPATIENT)
Dept: FAMILY MEDICINE | Facility: CLINIC | Age: 24
End: 2024-06-24
Payer: COMMERCIAL

## 2024-06-24 VITALS
HEIGHT: 61 IN | BODY MASS INDEX: 19.45 KG/M2 | DIASTOLIC BLOOD PRESSURE: 54 MMHG | OXYGEN SATURATION: 96 % | RESPIRATION RATE: 20 BRPM | HEART RATE: 92 BPM | SYSTOLIC BLOOD PRESSURE: 96 MMHG | TEMPERATURE: 98.5 F | WEIGHT: 103 LBS

## 2024-06-24 DIAGNOSIS — R10.84 ABDOMINAL PAIN, GENERALIZED: ICD-10-CM

## 2024-06-24 DIAGNOSIS — Z12.4 CERVICAL CANCER SCREENING: ICD-10-CM

## 2024-06-24 DIAGNOSIS — R74.8: ICD-10-CM

## 2024-06-24 DIAGNOSIS — Z00.00 PREVENTATIVE HEALTH CARE: ICD-10-CM

## 2024-06-24 DIAGNOSIS — Z00.00 ROUTINE GENERAL MEDICAL EXAMINATION AT A HEALTH CARE FACILITY: Primary | ICD-10-CM

## 2024-06-24 LAB
AMYLASE SERPL-CCNC: 121 U/L (ref 28–100)
BASOPHILS # BLD AUTO: 0.1 10E3/UL (ref 0–0.2)
BASOPHILS NFR BLD AUTO: 1 %
CHOLEST SERPL-MCNC: 185 MG/DL
EOSINOPHIL # BLD AUTO: 0.4 10E3/UL (ref 0–0.7)
EOSINOPHIL NFR BLD AUTO: 7 %
ERYTHROCYTE [DISTWIDTH] IN BLOOD BY AUTOMATED COUNT: 12.6 % (ref 10–15)
FASTING STATUS PATIENT QL REPORTED: ABNORMAL
HCT VFR BLD AUTO: 41.5 % (ref 35–47)
HDLC SERPL-MCNC: 58 MG/DL
HGB BLD-MCNC: 13.8 G/DL (ref 11.7–15.7)
IMM GRANULOCYTES # BLD: 0 10E3/UL
IMM GRANULOCYTES NFR BLD: 0 %
LDLC SERPL CALC-MCNC: 116 MG/DL
LYMPHOCYTES # BLD AUTO: 1.7 10E3/UL (ref 0.8–5.3)
LYMPHOCYTES NFR BLD AUTO: 32 %
MCH RBC QN AUTO: 25.4 PG (ref 26.5–33)
MCHC RBC AUTO-ENTMCNC: 33.3 G/DL (ref 31.5–36.5)
MCV RBC AUTO: 76 FL (ref 78–100)
MONOCYTES # BLD AUTO: 0.3 10E3/UL (ref 0–1.3)
MONOCYTES NFR BLD AUTO: 5 %
NEUTROPHILS # BLD AUTO: 2.9 10E3/UL (ref 1.6–8.3)
NEUTROPHILS NFR BLD AUTO: 54 %
NONHDLC SERPL-MCNC: 127 MG/DL
PLATELET # BLD AUTO: 287 10E3/UL (ref 150–450)
RBC # BLD AUTO: 5.43 10E6/UL (ref 3.8–5.2)
TRIGL SERPL-MCNC: 54 MG/DL
WBC # BLD AUTO: 5.4 10E3/UL (ref 4–11)

## 2024-06-24 PROCEDURE — G0145 SCR C/V CYTO,THINLAYER,RESCR: HCPCS | Performed by: FAMILY MEDICINE

## 2024-06-24 PROCEDURE — 99395 PREV VISIT EST AGE 18-39: CPT | Performed by: FAMILY MEDICINE

## 2024-06-24 PROCEDURE — 80061 LIPID PANEL: CPT | Performed by: FAMILY MEDICINE

## 2024-06-24 PROCEDURE — 85025 COMPLETE CBC W/AUTO DIFF WBC: CPT | Performed by: FAMILY MEDICINE

## 2024-06-24 PROCEDURE — 82150 ASSAY OF AMYLASE: CPT | Performed by: FAMILY MEDICINE

## 2024-06-24 PROCEDURE — 36415 COLL VENOUS BLD VENIPUNCTURE: CPT | Performed by: FAMILY MEDICINE

## 2024-06-24 PROCEDURE — 99213 OFFICE O/P EST LOW 20 MIN: CPT | Mod: 25 | Performed by: FAMILY MEDICINE

## 2024-06-24 RX ORDER — FAMOTIDINE 20 MG/1
20 TABLET, FILM COATED ORAL 2 TIMES DAILY PRN
Qty: 60 TABLET | Refills: 5 | Status: SHIPPED | OUTPATIENT
Start: 2024-06-24

## 2024-06-24 NOTE — PROGRESS NOTES
Preventive Care Visit  [facility] BARBERRADHA Dockery MD, Family Medicine  Jun 24, 2024      Assessment & Plan     Routine general medical examination at a health care facility  Labs, screenings, and vaccines as ordered and counseling as detailed below.    Cervical cancer screening  Has never had a pap smear before, agrees to do one today.  - Pap Screen Only - Recommended Age 21 - 24 Years    Abdominal pain, generalized  Very infrequent since treatment of H pylori, improves with famotidine. No red flag signs or symptoms. If she is using famotidine more regularly, advised to call clinic for appointment.  - famotidine (PEPCID) 20 MG tablet; Take 1 tablet (20 mg) by mouth 2 times daily as needed (stomach pain)    Preventative health care  Labs previously ordered by Dr. Salvador  - Lipid panel reflex to direct LDL Non-fasting  - CBC with platelets and differential    Elevated amylase measurement  Labs previously ordered by Dr. Salvador to follow up slightly elevated amylase level.  - Amylase            Counseling  Appropriate preventive services were discussed with this patient, including applicable screening as appropriate for fall prevention, nutrition, physical activity, Tobacco-use cessation, weight loss and cognition.  Checklist reviewing preventive services available has been given to the patient.  Reviewed patient's diet, addressing concerns and/or questions.   The patient was instructed to see the dentist every 6 months.       Follow up 1y for annual prventive.      Cindy Arizmendi is a 23 year old, presenting for the following:  Gyn Exam (Pap smear) and Medication Request (Famotidine )        6/24/2024    10:22 AM   Additional Questions   Roomed by JAI William   Accompanied by Self        Health Care Directive  Patient does not have a Health Care Directive or Living Will: not discussed    History of Present Illness       Reason for visit:  Pap smear       Needs refill on  famotidine, uses it for stomach pain  Had H pylori that was treated, had negative ISABEL  Was taking it once a day  Has some mild pain, achey crampy pain, only once per month  Pain did get better after taking H pylori meds  Famotidine helps pain she gets now    No changes to health  Working  Lives with family - parents, younger siblings (sister and brother)  Not aware of grandparents' health issues    Single, not sexually active                  6/24/2024   General Health   How would you rate your overall physical health? (!) FAIR            6/24/2024   Nutrition   Three or more servings of calcium each day? Yes   Diet: Regular (no restrictions)   How many servings of fruit and vegetables per day? (!) 2-3   How many sweetened beverages each day? 0-1            11/10/2023   Exercise   Frequency of exercise: None            6/24/2024   Social Factors   Worry food won't last until get money to buy more No   Food not last or not have enough money for food? No   Do you have housing? (Housing is defined as stable permanent housing and does not include staying ouside in a car, in a tent, in an abandoned building, in an overnight shelter, or couch-surfing.) Yes   Are you worried about losing your housing? No   Lack of transportation? Yes   Unable to get utilities (heat,electricity)? No       (!) TRANSPORTATION CONCERN PRESENT      6/24/2024   Dental   Dentist two times every year? (!) NO                 Today's PHQ-2 Score:       3/26/2024     7:26 AM   PHQ-2 ( 2000 [employer])   Q1: Little interest or pleasure in doing things 2   Q2: Feeling down, depressed or hopeless 0   PHQ-2 Score 2   Q1: Little interest or pleasure in doing things More than half the days   Q2: Feeling down, depressed or hopeless Not at all   PHQ-2 Score 2         6/24/2024   Substance Use   Alcohol more than 3/day or more than 7/wk No   Do you use any other substances recreationally? No        Social History     Tobacco Use    Smoking status: Never      "Passive exposure: Current    Smokeless tobacco: Never    Tobacco comments:     Father smokes    Vaping Use    Vaping status: Never Used   Substance Use Topics    Alcohol use: Never    Drug use: Never           2024   STI Screening   New sexual partner(s) since last STI/HIV test? No        History of abnormal Pap smear: No - age 21-29 PAP every 3 years recommended             2024   Contraception/Family Planning   Questions about contraception or family planning No           Reviewed and updated as needed this visit by Provider   Tobacco  Allergies  Meds  Problems  Med Hx  Surg Hx  Fam Hx     Sexual Activity          OB History    Para Term  AB Living   0 0 0 0 0 0   SAB IAB Ectopic Multiple Live Births   0 0 0 0 0     Patient Active Problem List   Diagnosis    Dental caries    Underweight     Past Surgical History:   Procedure Laterality Date    NO PAST SURGERIES         Social History     Tobacco Use    Smoking status: Never     Passive exposure: Current    Smokeless tobacco: Never    Tobacco comments:     Father smokes    Substance Use Topics    Alcohol use: Never     Family History   Problem Relation Age of Onset    Diabetes Mother     Hypertension Mother     Asthma Mother     Liver Disease Father     No Known Problems Sister     No Known Problems Brother     Cancer No family hx of          Current Outpatient Medications   Medication Sig Dispense Refill    acetaminophen (TYLENOL) 500 MG tablet Take 1-2 tablets (500-1,000 mg) by mouth every 6 hours as needed for fever or pain (Patient not taking: Reported on 3/26/2024) 50 tablet 4     No Known Allergies      Review of Systems  Constitutional, HEENT, cardiovascular, pulmonary, gi and gu systems are negative, except as otherwise noted.     Objective    Exam  BP 96/54 (BP Location: Left arm, Patient Position: Sitting, Cuff Size: Adult Regular)   Pulse 92   Temp 98.5  F (36.9  C) (Oral)   Resp 20   Ht 1.56 m (5' 1.42\")   Wt 46.7 kg " "(103 lb)   LMP 06/20/2024   SpO2 96%   BMI 19.20 kg/m     Estimated body mass index is 19.2 kg/m  as calculated from the following:    Height as of this encounter: 1.56 m (5' 1.42\").    Weight as of this encounter: 46.7 kg (103 lb).  Wt Readings from Last 3 Encounters:   06/24/24 46.7 kg (103 lb)   03/26/24 47.6 kg (105 lb)   11/10/23 48.2 kg (106 lb 4 oz)       Physical Exam  GENERAL: alert and no distress  EYES: Eyes grossly normal to inspection, PERRL and conjunctivae and sclerae normal  HENT: ear canals and TM's normal, nose and mouth without ulcers or lesions  NECK: no adenopathy, no asymmetry, masses, or scars  RESP: lungs clear to auscultation - no rales, rhonchi or wheezes  CV: regular rate and rhythm, normal S1 S2, no S3 or S4, no murmur, click or rub, no peripheral edema  ABDOMEN: soft, nontender, no hepatosplenomegaly, no masses and bowel sounds normal   (female): normal female external genitalia, normal urethral meatus, normal vaginal mucosa  MS: no gross musculoskeletal defects noted, no edema  SKIN: no suspicious lesions or rashes  NEURO: Normal strength and tone, mentation intact and speech normal  PSYCH: mentation appears normal, affect normal/bright        Signed Electronically by: India Dockery MD    "

## 2024-06-24 NOTE — Clinical Note
Hello, this patient needs medical transportation - she said it did not arrive for her today so I just wanted to be sure that it was set up for future appointments. Thanks!

## 2024-06-24 NOTE — PATIENT INSTRUCTIONS
"Patient Education   Preventive Care Advice   This is general advice we often give to help people stay healthy. Your care team may have specific advice just for you. Please talk to your care team about your own preventive care needs.  Lifestyle  Exercise at least 150 minutes each week (30 minutes a day, 5 days a week).  Do muscle strengthening activities 2 days a week. These help control your weight and prevent disease.  No smoking.  Wear sunscreen to prevent skin cancer.  Have your home tested for radon every 2 to 5 years. Radon is a colorless, odorless gas that can harm your lungs. To learn more, go to www.health.[facility].[place] and search for \"Radon in Homes.\"  Keep guns unloaded and locked up in a safe place like a safe or gun vault, or, use a gun lock and hide the keys. Always lock away bullets separately. To learn more, visit [employer].[place].gov and search for \"safe gun storage.\"  Nutrition  Eat 5 or more servings of fruits and vegetables each day.  Try wheat bread, brown rice and whole grain pasta (instead of white bread, rice, and pasta).  Get enough calcium and vitamin D. Check the label on foods and aim for 100% of the RDA (recommended daily allowance).  Regular exams  Have a dental exam and cleaning every 6 months.  See your health care team every year to talk about:  Any changes in your health.  Any medicines your care team has prescribed.  Preventive care, family planning, and ways to prevent chronic diseases.  Shots (vaccines)   HPV shots (up to age 26), if you've never had them before.  Hepatitis B shots (up to age 59), if you've never had them before.  COVID-19 shot: Get this shot when it's due.  Flu shot: Get a flu shot every year.  Tetanus shot: Get a tetanus shot every 10 years.  Pneumococcal, hepatitis A, and RSV shots: Ask your care team if you need these based on your risk.  Shingles shot (for age 50 and up).  General health tests  Diabetes screening:  Starting at age 35, Get screened for diabetes at least " every 3 years.  If you are younger than age 35, ask your care team if you should be screened for diabetes.  Cholesterol test: At age 39, start having a cholesterol test every 5 years, or more often if advised.  Bone density scan (DEXA): At age 50, ask your care team if you should have this scan for osteoporosis (brittle bones).  Hepatitis C: Get tested at least once in your life.  Abdominal aortic aneurysm screening: Talk to your doctor about having this screening if you:  Have ever smoked; and  Are biologically male; and  Are between the ages of 65 and 75.  STIs (sexually transmitted infections)  Before age 24: Ask your care team if you should be screened for STIs.  After age 24: Get screened for STIs if you're at risk. You are at risk for STIs (including HIV) if:  You are sexually active with more than one person.  You don't use condoms every time.  You or a partner was diagnosed with a sexually transmitted infection.  If you are at risk for HIV, ask about PrEP medicine to prevent HIV.  Get tested for HIV at least once in your life, whether you are at risk for HIV or not.  Cancer screening tests  Cervical cancer screening: If you have a cervix, begin getting regular cervical cancer screening tests at age 21. Most people who have regular screenings with normal results can stop after age 65. Talk about this with your provider.  Breast cancer scan (mammogram): If you've ever had breasts, begin having regular mammograms starting at age 40. This is a scan to check for breast cancer.  Colon cancer screening: It is important to start screening for colon cancer at age 45.  Have a colonoscopy test every 10 years (or more often if you're at risk) Or, ask your provider about stool tests like a FIT test every year or Cologuard test every 3 years.  To learn more about your testing options, visit: www.[employer]/722204.pdf.  For help making a decision, visit: [employer]/ro63510.  Prostate cancer screening test: If you have a  prostate and are age 55 to 69, ask your provider if you would benefit from a yearly prostate cancer screening test.  Lung cancer screening: If you are a current or former smoker age 50 to 80, ask your care team if ongoing lung cancer screenings are right for you.  For informational purposes only. Not to replace the advice of your health care provider. Copyright   2023 [facility]. All rights reserved. Clinically reviewed by the [facility]. [employer] [id number] - REV 04/24.

## 2024-06-24 NOTE — COMMUNITY RESOURCES LIST (ENGLISH)
June 24, 2024           YOUR PERSONALIZED LIST OF SERVICES & PROGRAMS               Medical Transportation, (NEMT)      [facility] - Transportation to medical appointments  [address] [place] [address] (Distance: 5.1 miles)  Phone: [phone]  Website: https://www.Refresh.io/  Language: English  Fee: Self pay, Insurance      - [place] - Non-Emergency Medical Transportation  [address] [facility] [address] [place] [address] (Distance: 6.9 miles)  Phone: [phone]  Website: http://[email or web address].[email or web address]/[place]/  Language: English, Lao, Greek  Fee: Insurance  Accessibility: Ada accessible      Social Service [facility] - Neighbor to Neighbor Program  Phone: [phone]  Email: law@[facility]  Website: https://www.[facility].org/services/older-adults/-services/neighbor-to-neighbor  Language: English  Hours: Mon 8:00 AM - 5:00 PM Tue 8:00 AM - 5:00 PM Wed 8:00 AM - 5:00 PM Thu 8:00 AM - 5:00 PM Fri 8:00 AM - 5:00 PM  Fee: Insurance, Self pay  Accessibility: Deaf or hard of hearing, Blind accommodation, Translation services    Expense Assistance      [facility] Transit Assistance Program (TAP) - Transportation expense assistance  [address] [place] [address] (Distance: 2.3 miles)  Language: English, Lao  Fee: Free, Sliding scale, Self pay  Accessibility: Translation services      [place] - Transit Link  [address] [place] [address] (Distance: 2.2 miles)  Phone: [phone] [phone]  Website: https://[employer]/Transportation/Services/Transit-Link.aspx  Language: English  Fee: Self pay      - Dislocated Worker/Adult [employer] Employment Program  Phone: [phone]  Email: ilan@[employer].org  Website: https://[employer]/services/employment-services/dislocated-worker-program/  Language: English, Greek  Hours: Mon 8:00 AM - 4:30 PM Tue 8:00 AM - 4:30 PM Wed 8:00 AM - 4:30 PM Thu 8:00 AM - 4:30 PM Fri 8:00 AM - 4:30 PM  Fee:  Free  Accessibility: Ada accessible    Coordination      [facility] - Paratransit or Dial-A-Ride service  [address] [place] [facility] [address] (Distance: 2.2 miles)  Phone: [phone]  Website: http://[employer]  Language: English  Fee: Self pay  Accessibility: Translation services, Ada accessible      Transit - [place] - Transit Link  [address] [place] [address] (Distance: 2.3 miles)  Language: English  Fee: Self pay  Accessibility: Translation services      [facility] - How [facility] works  Phone: [phone]) [phone]  Website: https://www.[facility]/members/shop-plans/[place]-health-care-programs/blueride-transportation  Language: English  Hours: Mon 8:00 AM - 5:00 PM Tue 8:00 AM - 5:00 PM Wed 8:00 AM - 5:00 PM Thu 8:00 AM - 5:00 PM Fri 8:00 AM - 5:00 PM               IMPORTANT NUMBERS & WEBSITES        Emergency Services  911  .   [facility]  211 http://[email or web address]  .   Poison Control  (969) 766-1055 http://[place]poison.org http://wis[place]poison.org  .     Suicide and Crisis Lifeline  988 http://988lifeline.org  .   Childhelp National Child Abuse Hotline  362.384.9223 http://Childhelphotline.org   .   National Sexual Assault Hotline  (238) 548-8950 (HOPE) http://Rainn.org   .     National Runaway Safeline  (503) 639-6707 (RUNAWAY) http://1800runaCmilligan Investments.org  .   Pregnancy & Postpartum Support  Call/text 703-452-2230  [place]: http://ppsupportmn.org  [place]: http://[employer].com/wi  .   Substance Abuse National Helpline ([facility])  562-622-HELP (0791) http://Findtreatment.gov   .                DISCLAIMER: These resources have been generated via the [employer] Platform. [employer] does not endorse any service providers mentioned in this resource list. [employer] does not guarantee that the services mentioned in this resource list will be available to you or will improve your health or wellness.    [facility]

## 2024-06-27 LAB
BKR LAB AP GYN ADEQUACY: NORMAL
BKR LAB AP GYN INTERPRETATION: NORMAL
BKR LAB AP HPV REFLEX: NO
BKR LAB AP PREVIOUS ABNORMAL: NORMAL
PATH REPORT.COMMENTS IMP SPEC: NORMAL
PATH REPORT.COMMENTS IMP SPEC: NORMAL
PATH REPORT.RELEVANT HX SPEC: NORMAL